# Patient Record
Sex: MALE | URBAN - METROPOLITAN AREA
[De-identification: names, ages, dates, MRNs, and addresses within clinical notes are randomized per-mention and may not be internally consistent; named-entity substitution may affect disease eponyms.]

---

## 2018-02-23 ENCOUNTER — IMPORTED ENCOUNTER (OUTPATIENT)
Dept: URBAN - METROPOLITAN AREA CLINIC 31 | Facility: CLINIC | Age: 78
End: 2018-02-23

## 2018-02-23 PROBLEM — Z96.1: Noted: 2018-02-23

## 2018-02-23 PROCEDURE — 92014 COMPRE OPH EXAM EST PT 1/>: CPT

## 2018-02-23 PROCEDURE — 92015 DETERMINE REFRACTIVE STATE: CPT

## 2018-02-23 NOTE — PATIENT DISCUSSION
1.  Pseudophakia OU - IOLs stable. Monitor. 2. Refractive error -  new SV reading Rx to move focal range out. 3. Return for an appointment in 1 year for comprehensive exam. with Dr. Gal Contreras.

## 2022-04-02 ASSESSMENT — VISUAL ACUITY
OD_CC: J1
OS_CC: J1+
OD_CC: 20/40
OS_CC: 20/25-2
OD_PH: SC 20/30 -2

## 2022-04-02 ASSESSMENT — TONOMETRY
OD_IOP_MMHG: 14
OS_IOP_MMHG: 15